# Patient Record
Sex: FEMALE | Race: OTHER | NOT HISPANIC OR LATINO | Employment: STUDENT | URBAN - METROPOLITAN AREA
[De-identification: names, ages, dates, MRNs, and addresses within clinical notes are randomized per-mention and may not be internally consistent; named-entity substitution may affect disease eponyms.]

---
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
## 2022-06-21 ENCOUNTER — OFFICE VISIT (OUTPATIENT)
Dept: FAMILY MEDICINE CLINIC | Facility: CLINIC | Age: 22
End: 2022-06-21

## 2022-06-21 VITALS
TEMPERATURE: 97.5 F | DIASTOLIC BLOOD PRESSURE: 84 MMHG | WEIGHT: 210.6 LBS | HEART RATE: 111 BPM | OXYGEN SATURATION: 98 % | SYSTOLIC BLOOD PRESSURE: 118 MMHG | RESPIRATION RATE: 18 BRPM | BODY MASS INDEX: 36.15 KG/M2

## 2022-06-21 DIAGNOSIS — R19.7 DIARRHEA, UNSPECIFIED TYPE: ICD-10-CM

## 2022-06-21 DIAGNOSIS — R10.84 GENERALIZED ABDOMINAL PAIN: Primary | ICD-10-CM

## 2022-06-21 DIAGNOSIS — R11.2 NAUSEA AND VOMITING, UNSPECIFIED VOMITING TYPE: ICD-10-CM

## 2022-06-21 PROCEDURE — 99213 OFFICE O/P EST LOW 20 MIN: CPT | Performed by: FAMILY MEDICINE

## 2022-06-21 RX ORDER — ONDANSETRON 4 MG/1
4 TABLET, FILM COATED ORAL EVERY 6 HOURS PRN
Qty: 20 TABLET | Refills: 0 | Status: SHIPPED | OUTPATIENT
Start: 2022-06-21 | End: 2022-06-26

## 2022-06-21 RX ORDER — DIPHENOXYLATE HYDROCHLORIDE AND ATROPINE SULFATE 2.5; .025 MG/1; MG/1
1 TABLET ORAL 3 TIMES DAILY PRN
Qty: 9 TABLET | Refills: 0 | Status: SHIPPED | OUTPATIENT
Start: 2022-06-21 | End: 2022-06-24

## 2022-06-21 NOTE — PROGRESS NOTES
Subjective   Al Phan is a 22 y.o. female.   Chief Complaint   Patient presents with   • Abdominal Pain       No sick contacts, no fever  1 day of nausea, vomiting, diarrhea    Abdominal Pain  This is a new problem. The current episode started yesterday. The problem occurs constantly. The pain is located in the generalized abdominal region. The quality of the pain is described as cramping and aching. The pain does not radiate. Associated symptoms include diarrhea, nausea and vomiting. Pertinent negatives include no constipation, dysuria, fever, frequency, hematuria or rash. Nothing relieves the symptoms. Past treatments include acetaminophen. The treatment provided no improvement relief.      The following portions of the patient's history were reviewed and updated as appropriate: allergies, current medications, past family history, past medical history, past social history, past surgical history and problem list.    Patient Active Problem List   Diagnosis   • Moderate persistent asthma without complication   • Other seasonal allergic rhinitis       Current Outpatient Medications on File Prior to Visit   Medication Sig Dispense Refill   • albuterol sulfate  (90 Base) MCG/ACT inhaler Inhale 2 puffs 4 (Four) Times a Day As Needed.     • citalopram (CeleXA) 20 MG tablet Take 1 tablet by mouth Daily.     • clobetasol (TEMOVATE) 0.05 % ointment APPLY THIN COAT TO AFFECTED AREA TWICE A DAY     • fluticasone-salmeterol (ADVAIR) 100-50 MCG/DOSE DISKUS Inhale 1 puff Daily.     • montelukast (SINGULAIR) 10 MG tablet Take 1 tablet by mouth Daily.     • norethindrone-ethinyl estradiol (Loestrin 1/20, 21,) 1-20 MG-MCG per tablet Take 1 tablet by mouth Daily. 28 tablet 12     No current facility-administered medications on file prior to visit.     Current outpatient and discharge medications have been reconciled for the patient.  Reviewed by: Duane Ortega MD      No Known Allergies    Review of Systems    Constitutional: Negative for activity change, appetite change, fatigue and fever.   HENT: Negative for ear pain, swollen glands and voice change.    Eyes: Negative for visual disturbance.   Respiratory: Negative for shortness of breath and wheezing.    Cardiovascular: Negative for chest pain and leg swelling.   Gastrointestinal: Positive for abdominal pain, diarrhea, nausea and vomiting. Negative for blood in stool and constipation.   Endocrine: Negative for polydipsia and polyuria.   Genitourinary: Negative for dysuria, frequency and hematuria.   Musculoskeletal: Negative for joint swelling, neck pain and neck stiffness.   Skin: Negative for rash and wound.   Neurological: Negative for weakness, numbness and headache.   Psychiatric/Behavioral: Negative for suicidal ideas and depressed mood.     I have reviewed and confirmed the accuracy of the ROS as documented by the MA/LPN/RN Duane Ortega MD    Objective   Visit Vitals  /84 (BP Location: Right arm, Patient Position: Sitting, Cuff Size: Adult)   Pulse 111   Temp 97.5 °F (36.4 °C)   Resp 18   Wt 95.5 kg (210 lb 9.6 oz)   LMP 05/25/2022   SpO2 98%   BMI 36.15 kg/m²       Physical Exam  Constitutional:       Appearance: She is well-developed.   HENT:      Head: Normocephalic and atraumatic.      Right Ear: External ear normal.      Left Ear: External ear normal.      Nose: Nose normal.   Eyes:      Pupils: Pupils are equal, round, and reactive to light.   Cardiovascular:      Rate and Rhythm: Normal rate and regular rhythm.      Heart sounds: Normal heart sounds.   Pulmonary:      Effort: Pulmonary effort is normal.      Breath sounds: Normal breath sounds.   Abdominal:      General: Bowel sounds are increased.      Palpations: Abdomen is soft.      Tenderness: There is no guarding or rebound. Negative signs include Thomas's sign and McBurney's sign.   Musculoskeletal:         General: Normal range of motion.      Cervical back: Normal range of  motion and neck supple.   Skin:     General: Skin is warm and dry.   Neurological:      Mental Status: She is alert and oriented to person, place, and time.   Psychiatric:         Behavior: Behavior normal.         Thought Content: Thought content normal.         Judgment: Judgment normal.       Derm Physical Exam    Diagnoses and all orders for this visit:    1. Generalized abdominal pain (Primary)    2. Diarrhea, unspecified type  -     diphenoxylate-atropine (LOMOTIL) 2.5-0.025 MG per tablet; Take 1 tablet by mouth 3 (Three) Times a Day As Needed for Diarrhea for up to 3 days.  Dispense: 9 tablet; Refill: 0    3. Nausea and vomiting, unspecified vomiting type  -     ondansetron (Zofran) 4 MG tablet; Take 1 tablet by mouth Every 6 (Six) Hours As Needed for Nausea or Vomiting for up to 5 days.  Dispense: 20 tablet; Refill: 0     Findings discussed. All questions answered.  Follow-up in approximately 3 days for reevaluation if not improved.  Follow-up sooner for worsening symptoms or any concerns.  Differential diagnosis discussed.   May need labs/imaging if sx persist or worsen     I wore protective equipment throughout this patient encounter to include mask and eye protection. Hand hygiene was performed before donning protective equipment and after removal when leaving the room

## 2022-09-19 ENCOUNTER — TRANSCRIBE ORDERS (OUTPATIENT)
Dept: PHYSICAL THERAPY | Facility: CLINIC | Age: 22
End: 2022-09-19

## 2022-09-19 DIAGNOSIS — M54.42 CHRONIC LEFT-SIDED LOW BACK PAIN WITH LEFT-SIDED SCIATICA: Primary | ICD-10-CM

## 2022-09-19 DIAGNOSIS — G89.29 CHRONIC LEFT-SIDED LOW BACK PAIN WITH LEFT-SIDED SCIATICA: Primary | ICD-10-CM

## 2022-09-23 ENCOUNTER — TREATMENT (OUTPATIENT)
Dept: PHYSICAL THERAPY | Facility: CLINIC | Age: 22
End: 2022-09-23

## 2022-09-23 DIAGNOSIS — G89.29 CHRONIC BILATERAL LOW BACK PAIN WITH BILATERAL SCIATICA: Primary | ICD-10-CM

## 2022-09-23 DIAGNOSIS — M54.42 CHRONIC BILATERAL LOW BACK PAIN WITH BILATERAL SCIATICA: Primary | ICD-10-CM

## 2022-09-23 DIAGNOSIS — M54.41 CHRONIC BILATERAL LOW BACK PAIN WITH BILATERAL SCIATICA: Primary | ICD-10-CM

## 2022-09-23 PROCEDURE — 97110 THERAPEUTIC EXERCISES: CPT | Performed by: PHYSICAL THERAPIST

## 2022-09-23 PROCEDURE — 97012 MECHANICAL TRACTION THERAPY: CPT | Performed by: PHYSICAL THERAPIST

## 2022-09-23 PROCEDURE — 97140 MANUAL THERAPY 1/> REGIONS: CPT | Performed by: PHYSICAL THERAPIST

## 2022-09-23 PROCEDURE — 97161 PT EVAL LOW COMPLEX 20 MIN: CPT | Performed by: PHYSICAL THERAPIST

## 2022-09-23 NOTE — PROGRESS NOTES
Physical Therapy Initial Evaluation and Plan of Care    Patient: Al Phan   : 2000  Diagnosis/ICD-10 Code:  Chronic bilateral low back pain with bilateral sciatica [M54.42, M54.41, G89.29]  Referring practitioner: MILES Palmer  Date of Initial Visit: 2022  Today's Date: 2022  Patient seen for 1 sessions             Subjective Evaluation    History of Present Illness  Mechanism of injury: Patient is a 22 y.o. WF who presents with chronic LBP and radicular pain to knees.  Pain is always worst on L.  Pain rating 7/10 and increases with walking, standing, lifting, exercise.  Pain decreases with sitting or rest.  Patient works at [employer] year round x 4 years, lots of standing, walking and lifting.  Personal goals, decrease pain and increase physical activity.      Patient Goals  Patient goals for therapy: decreased pain, return to sport/leisure activities and increased strength             Objective Oswestry score indicates 34% impairment with limitations in pain, walking, standing, exercise, lifting.  Lumbar AROM is WNL with pain on L lumbar with extension and SBL and rotation L.  No significant tenderness to palpation.  Min tight hams L.  MMT:  4-5/5 B LEs without pain.  Repeated sit to stand = 13 reps in 30 seconds without arm assist.  Negative SLR.  No increased pain with PA glide and/or rotation of L1-5.  Deconditioned abdominals with protruding abdomen.  Only able to maintain neutral spine with 15 deg B leg lower in supine.  Only able to hold crunch with scap off table for 20 sec.      Assessment & Plan     Assessment  Impairments: abnormal muscle tone, abnormal or restricted ROM, activity intolerance, impaired balance, impaired physical strength, lacks appropriate home exercise program and pain with function  Functional Limitations: carrying objects, lifting, walking, uncomfortable because of pain and standing  Goals  Plan Goals: Patient independent with HEP in 2  weeks  Tolerate 10 min Nustep in 2 weeks  Able to walk 45 min without pain while walking in 2 weeks  Able to decrease pain to 5/10 in 2 weeks  Improve function as evidenced by Oswestry score of 20% or less in 12 weeks (34% impairment initially)  Able to return to working out in 12 weeks  Perform 10 reps of repeated sit to stand without arms in 30 seconds in 12 weeks      Plan  Therapy options: will be seen for skilled therapy services  Planned modality interventions: traction  Planned therapy interventions: strengthening, stretching, therapeutic activities, neuromuscular re-education, postural training, manual therapy, balance/weight-bearing training, flexibility, functional ROM exercises and home exercise program  Frequency: 2x week  Duration in weeks: 12  Treatment plan discussed with: patient        Timed:         Manual Therapy:    15     mins  26522;     Therapeutic Exercise:    15     mins  76362;     Neuromuscular Glenys:        mins  93624;    Therapeutic Activity:          mins  71638;     Gait Training:           mins  41917;     Ultrasound:          mins  35025;    Self-care  ____ mins 64426    Un-Timed:  Electrical Stimulation:         mins  27491 (MC );  Dry Needling          mins self-pay 20561  Traction     15     mins 58019  Low Eval     15     Mins  82029  Mod Eval          Mins  39874  Canal repositioning _____ mins  93113        Timed Treatment:   30   mins   Total Treatment:     60   mins    PT SIGNATURE: Robin A Sprigler, PT   [place] license # [id number]  Electronically signed by Robin A Sprigler, PT, 09/23/22, 12:17 PM EDT    Initial Certification  Certification Period: 9/23/2022 through 12/21/2022  I certify that the therapy services are furnished while this patient is under my care.  The services outlined above are required by this patient, and will be reviewed every 90 days.     PHYSICIAN: Ning Ward,  APRN ______________________________________________________________________________________________     DATE: _________________________________________________________________________________________________________________________________    Please sign and return via fax to [phone]. Thank you, [facility].

## 2022-10-13 ENCOUNTER — TREATMENT (OUTPATIENT)
Dept: PHYSICAL THERAPY | Facility: CLINIC | Age: 22
End: 2022-10-13

## 2022-10-13 DIAGNOSIS — M54.41 CHRONIC BILATERAL LOW BACK PAIN WITH BILATERAL SCIATICA: Primary | ICD-10-CM

## 2022-10-13 DIAGNOSIS — G89.29 CHRONIC BILATERAL LOW BACK PAIN WITH BILATERAL SCIATICA: Primary | ICD-10-CM

## 2022-10-13 DIAGNOSIS — M54.42 CHRONIC BILATERAL LOW BACK PAIN WITH BILATERAL SCIATICA: Primary | ICD-10-CM

## 2022-10-13 PROCEDURE — 97110 THERAPEUTIC EXERCISES: CPT | Performed by: PHYSICAL THERAPIST

## 2022-10-13 PROCEDURE — 97112 NEUROMUSCULAR REEDUCATION: CPT | Performed by: PHYSICAL THERAPIST

## 2022-10-13 NOTE — PROGRESS NOTES
Physical Therapy Daily Progress Note    Patient: Al Phan   : 2000  Diagnosis/ICD-10 Code:  Chronic bilateral low back pain with bilateral sciatica [M54.42, M54.41, G89.29]  Referring practitioner: MILES Palmer  Date of Initial Visit: Type: THERAPY  Noted: 2022  Today's Date: 10/13/2022  Patient seen for 2 sessions             Subjective Patient reports she doesn't wish to do traction again but the exercises have decreased back pain.  Her knees are hurting lately.  No c/o back pain today.  She has been able to work out the past couple of weeks.    Objective   See Exercise, Manual, and Modality Logs for complete treatment. Will hold sit to stand and see if that decreases knee pain.  Added pelvic stabilization with bird dog, angry cat, upper row and lat pull.  Added leg press.  Plan to add side plank and bridge NEXT.      Assessment/Plan  Patient independent with HEP in 2 weeks - MET  Tolerate 10 min Nustep in 2 weeks - NOT MET  Able to walk 45 min without pain while walking in 2 weeks  NOT MET  Able to decrease pain to 5/10 in 2 weeks  MET  Improve function as evidenced by Oswestry score of 20% or less in 12 weeks (34% impairment initially)  NOT MET  Able to return to working out in 12 weeks  MET  Perform 10 reps of repeated sit to stand without arms in 30 seconds in 12 weeks   MET    Progress per Plan of Care expiring 22           Timed:         Manual Therapy:        mins  95082;     Therapeutic Exercise:    15     mins  58069;     Neuromuscular Glenys:    15    mins  18353;    Therapeutic Activity:          mins  99735;     Gait Training:           mins  75955;     Ultrasound:          mins  81045;    Ionto                                   mins   47945  Self Care                            mins   38877      Un-Timed:  Electrical Stimulation:         mins  64217 ( );  Dry Needling          mins self-pay  Traction          mins 11683  Low Eval          Mins  00697  Mod  Eval          Mins  41534  High Eval                            Mins  00282  Canalith Repos                   mins  49390    Timed Treatment:   30   mins   Total Treatment:     30   mins    Robin A Sprigler, PT  Physical Therapist

## 2022-10-27 ENCOUNTER — TREATMENT (OUTPATIENT)
Dept: PHYSICAL THERAPY | Facility: CLINIC | Age: 22
End: 2022-10-27

## 2022-10-27 DIAGNOSIS — M54.41 CHRONIC BILATERAL LOW BACK PAIN WITH BILATERAL SCIATICA: Primary | ICD-10-CM

## 2022-10-27 DIAGNOSIS — G89.29 CHRONIC BILATERAL LOW BACK PAIN WITH BILATERAL SCIATICA: Primary | ICD-10-CM

## 2022-10-27 DIAGNOSIS — M54.42 CHRONIC BILATERAL LOW BACK PAIN WITH BILATERAL SCIATICA: Primary | ICD-10-CM

## 2022-10-27 PROCEDURE — 97112 NEUROMUSCULAR REEDUCATION: CPT | Performed by: PHYSICAL THERAPIST

## 2022-10-27 PROCEDURE — 97110 THERAPEUTIC EXERCISES: CPT | Performed by: PHYSICAL THERAPIST

## 2022-10-27 NOTE — PROGRESS NOTES
Physical Therapy Daily Treatment Note    Patient: Al Phan   : 2000  Diagnosis/ICD-10 Code:  Chronic bilateral low back pain with bilateral sciatica [M54.42, M54.41, G89.29]  Referring practitioner: MILES Palmer  Date of Initial Visit: Type: THERAPY  Noted: 2022  Today's Date: 10/27/2022  Patient seen for 3 sessions             Subjective Patient reports she went to a wedding last weekend and had increased pain in back and knees.  She almost called in to work and has never done that.  She reports difficulty finding a comfortable position to sleep.    Objective   See Exercise, Manual, and Modality Logs for complete treatment. Discussed hooklying position for sleeping and tried in clinic with MH on back and knees.  Reviewed and updated HEP.  Added Nustep, side plank and bridging.      Assessment/Plan  Patient independent with HEP in 2 weeks - MET  Tolerate 10 min Nustep in 2 weeks - NOT MET  Able to walk 45 min without pain while walking in 2 weeks  NOT MET  Able to decrease pain to 5/10 in 2 weeks  MET  Improve function as evidenced by Oswestry score of 20% or less in 12 weeks (34% impairment initially)  NOT MET  Able to return to working out in 12 weeks  MET  Perform 10 reps of repeated sit to stand without arms in 30 seconds in 12 weeks   MET    Progress per Plan of Care expiring 22           Timed:         Manual Therapy:         mins  69080;     Therapeutic Exercise:    15     mins  56975;     Neuromuscular Glenys:    15    mins  65154;    Therapeutic Activity:          mins  26522;     Gait Training:           mins  05285;     Ultrasound:          mins  71990;    Ionto                                   mins   94450  Self Care                            mins   11070      Un-Timed:  Electrical Stimulation:         mins  51410 ( );  Dry Needling          mins self-pay  Traction          mins 53423  Low Eval          Mins  29535  Mod Eval          Mins  63016  High Eval                             Mins  43937  [facility]                   mins  40958    Timed Treatment:   30   mins   Total Treatment:     40   mins    Robin A Sprigler, PT  Physical Therapist

## 2022-11-03 ENCOUNTER — TREATMENT (OUTPATIENT)
Dept: PHYSICAL THERAPY | Facility: CLINIC | Age: 22
End: 2022-11-03

## 2022-11-03 DIAGNOSIS — M54.41 CHRONIC BILATERAL LOW BACK PAIN WITH BILATERAL SCIATICA: Primary | ICD-10-CM

## 2022-11-03 DIAGNOSIS — M54.42 CHRONIC BILATERAL LOW BACK PAIN WITH BILATERAL SCIATICA: Primary | ICD-10-CM

## 2022-11-03 DIAGNOSIS — G89.29 CHRONIC BILATERAL LOW BACK PAIN WITH BILATERAL SCIATICA: Primary | ICD-10-CM

## 2022-11-03 PROCEDURE — 97112 NEUROMUSCULAR REEDUCATION: CPT | Performed by: PHYSICAL THERAPIST

## 2022-11-03 PROCEDURE — 97110 THERAPEUTIC EXERCISES: CPT | Performed by: PHYSICAL THERAPIST

## 2022-11-03 NOTE — PROGRESS NOTES
Physical Therapy Daily Treatment Note      Patient: Al Phan   : 2000  Diagnosis/ICD-10 Code:  Chronic bilateral low back pain with bilateral sciatica [M54.42, M54.41, G89.29]   Problems Addressed this Visit    None  Visit Diagnoses     Chronic bilateral low back pain with bilateral sciatica    -  Primary      Diagnoses       Codes Comments    Chronic bilateral low back pain with bilateral sciatica    -  Primary ICD-10-CM: M54.42, M54.41, G89.29  ICD-9-CM: 724.2, 724.3, 338.29          Referring practitioner: MILES Palmer  Date of Initial Visit: Type: THERAPY  Noted: 2022  Today's Date: 11/3/2022    VISIT#: 4    Subjective Patient reports feeling pretty good the past couple days, with only mild aching in low back, which is improved since beginning PT.       Objective     See Exercise, Manual, and Modality Logs for complete treatment.     Assessment/Plan Patient with noticeable shaking with plank and required mild verbal cues for correct technique. Patient will continue to benefit from core strengthening for improved stability with with daily functional activity.     Patient independent with HEP in 2 weeks - MET  Tolerate 10 min Nustep in 2 weeks - NOT MET  Able to walk 45 min without pain while walking in 2 weeks  NOT MET  Able to decrease pain to 5/10 in 2 weeks  MET  Improve function as evidenced by Oswestry score of 20% or less in 12 weeks (34% impairment initially)  NOT MET  Able to return to working out in 12 weeks  MET  Perform 10 reps of repeated sit to stand without arms in 30 seconds in 12 weeks   MET  Progress per Plan of Care and Progress strengthening /stabilization /functional activity         Timed:         Manual Therapy:         mins  78319;     Therapeutic Exercise:    20     mins  41460;     Neuromuscular Glenys:    15    mins  10879;    Therapeutic Activity:          mins  95025;     Gait Training:           mins  83031;     Ultrasound:          mins  89878;    Ionto                                    mins   24259  Self Care                    _____  mins   63152  [facility]                   mins  05528    Un-Timed:  Electrical Stimulation:         mins  09492 ( );  Dry Needling          mins self-pay   Traction          mins 38830    Timed Treatment:   35   mins   Total Treatment:     35   mins    Don Jefefrs PTA  [place] [id number]  Physical Therapist Assistant

## 2022-11-10 ENCOUNTER — TREATMENT (OUTPATIENT)
Dept: PHYSICAL THERAPY | Facility: CLINIC | Age: 22
End: 2022-11-10

## 2022-11-10 DIAGNOSIS — M54.42 CHRONIC BILATERAL LOW BACK PAIN WITH BILATERAL SCIATICA: Primary | ICD-10-CM

## 2022-11-10 DIAGNOSIS — G89.29 CHRONIC BILATERAL LOW BACK PAIN WITH BILATERAL SCIATICA: Primary | ICD-10-CM

## 2022-11-10 DIAGNOSIS — M54.41 CHRONIC BILATERAL LOW BACK PAIN WITH BILATERAL SCIATICA: Primary | ICD-10-CM

## 2022-11-10 PROCEDURE — 97530 THERAPEUTIC ACTIVITIES: CPT | Performed by: PHYSICAL THERAPIST

## 2022-11-10 PROCEDURE — 97112 NEUROMUSCULAR REEDUCATION: CPT | Performed by: PHYSICAL THERAPIST

## 2022-11-10 PROCEDURE — 97110 THERAPEUTIC EXERCISES: CPT | Performed by: PHYSICAL THERAPIST

## 2022-11-10 NOTE — PROGRESS NOTES
"     Physical Therapy Daily Treatment Note    Patient: Al Phan   : 2000  Diagnosis/ICD-10 Code:  Chronic bilateral low back pain with bilateral sciatica [M54.42, M54.41, G89.29]  Referring practitioner: MILES Palmer  Date of Initial Visit: Type: THERAPY  Noted: 2022  Today's Date: 11/10/2022  Patient seen for 5 sessions             Subjective Patient reports she's doing well with less pain and increasing core strength and stability.  She has returned to working out as schedule allows.  PT has really helped her get through the week with significantly decreased pain.  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  She has not tried to walk 45 min yet so will try that before next visit.  She is sleeping better.  She reports decreased pain and stiffness with recent trip to [place] 3 hour [address].    Objective   See Exercise, Manual, and Modality Logs for complete treatment. Able to tolerate increased weight on leg press, 10 min on seated elliptical.  Oswestry score reassessed today indicates 12% impairment improved from 34% initially. Lower abdominals have improved strength, able to stabilize spine and lower legs to 45 deg in supine.  Discussed proper lifting techniques and engaging abdominals.  Added plank leg extensions to progress pelvic stabilization.  Able to hold plank 15 sec today.        Assessment/Plan  Patient independent with HEP in 2 week -  MET  Tolerate 10 min Nustep in 2 weeks - MET  Able to walk 45 min without pain while walking in 2 weeks  NOT MET  Able to decrease pain to 5/10 in 2 weeks  MET  Improve function as evidenced by Oswestry score of 20% or less in 12 weeks (34% impairment initially)  NOT MET  Able to return to working out in 12 weeks  MET  Perform 10 reps of repeated sit to stand without arms in 30 seconds in 12 weeks   MET    Progress per Plan of Care and Anticipate DC next Visit expiring 22           Timed: "         Manual Therapy:         mins  01444;     Therapeutic Exercise:    15     mins  72262;     Neuromuscular Glenys:    15    mins  58332;    Therapeutic Activity:   10       mins  71545;     Gait Training:           mins  13322;     Ultrasound:          mins  89303;    Ionto                                   mins   71725  Self Care                            mins   14232      Un-Timed:  Electrical Stimulation:         mins  16773 ( );  Dry Needling          mins self-pay  Traction          mins 09619  Low Eval          Mins  77382  Mod Eval          Mins  04289  High Eval                            Mins  14710  [facility]                   mins  72011    Timed Treatment:   40   mins   Total Treatment:     40   mins    Robin A Sprigler, PT  Physical Therapist

## 2022-11-17 ENCOUNTER — DOCUMENTATION (OUTPATIENT)
Dept: PHYSICAL THERAPY | Facility: CLINIC | Age: 22
End: 2022-11-17

## 2022-11-17 DIAGNOSIS — M54.41 CHRONIC BILATERAL LOW BACK PAIN WITH BILATERAL SCIATICA: Primary | ICD-10-CM

## 2022-11-17 DIAGNOSIS — G89.29 CHRONIC BILATERAL LOW BACK PAIN WITH BILATERAL SCIATICA: Primary | ICD-10-CM

## 2022-11-17 DIAGNOSIS — M54.42 CHRONIC BILATERAL LOW BACK PAIN WITH BILATERAL SCIATICA: Primary | ICD-10-CM

## 2022-11-17 NOTE — PROGRESS NOTES
Goals: Partially Met    Discharge Plan: Continue with current home exercise program as instructed    Comments  Patient failed to return for 11/17 visit.  Discharge done in error.  Patient had rescheduled for 12/1.                        Date of Discharge 11/17/22        Robin A Sprigler, PT  Physical Therapist

## 2022-12-01 ENCOUNTER — TREATMENT (OUTPATIENT)
Dept: PHYSICAL THERAPY | Facility: CLINIC | Age: 22
End: 2022-12-01

## 2022-12-01 DIAGNOSIS — M54.42 CHRONIC BILATERAL LOW BACK PAIN WITH BILATERAL SCIATICA: Primary | ICD-10-CM

## 2022-12-01 DIAGNOSIS — M54.41 CHRONIC BILATERAL LOW BACK PAIN WITH BILATERAL SCIATICA: Primary | ICD-10-CM

## 2022-12-01 DIAGNOSIS — G89.29 CHRONIC BILATERAL LOW BACK PAIN WITH BILATERAL SCIATICA: Primary | ICD-10-CM

## 2022-12-01 PROCEDURE — 97110 THERAPEUTIC EXERCISES: CPT | Performed by: PHYSICAL THERAPIST

## 2022-12-01 PROCEDURE — 97530 THERAPEUTIC ACTIVITIES: CPT | Performed by: PHYSICAL THERAPIST

## 2022-12-01 NOTE — PROGRESS NOTES
"Discharge Summary  Discharge Summary from Physical Therapy Report          Goals: All Met    Discharge Plan: Continue with current home exercise program as instructed    Comments See note below    Date of Discharge 22        Robin A Sprigler, PT  Physical Therapist               Physical Therapy Daily Treatment Note    Patient: Al Phan   : 2000  Diagnosis/ICD-10 Code:  Chronic bilateral low back pain with bilateral sciatica [M54.42, M54.41, G89.29]  Referring practitioner: MILES Palmer  Date of Initial Visit: No linked episodes  Today's Date: 2022  Patient seen for 6 sessions             Subjective Patient reports she's doing well with less pain and increasing core strength and stability.  She has returned to working out as schedule allows.  PT has really helped her get through the week with significantly decreased pain.  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  She was able to walk 60  min without any pain.  She is sleeping better.  She reports decreased pain and stiffness with recent trip to [place] 3 hour drive.    Objective   See Exercise, Manual, and Modality Logs for complete treatment. Able to tolerate increased weight on leg press, 10 min on seated elliptical.  Progressed UE strengthening today with good response.  Oswestry score reassessed today indicates 12% impairment improved from 34% initially. Lower abdominals have improved strength, able to stabilize spine and lower legs to 45 deg in supine.  Discussed proper lifting techniques and engaging abdominals.  Added plank leg extensions to progress pelvic stabilization.  Able to hold plank 15 sec today.  All goals met.      Assessment/Plan  Patient independent with HEP in 2 week -  MET  Tolerate 10 min Nustep in 2 weeks - MET  Able to walk 45 min without pain while walking in 2 weeks   MET  Able to decrease pain to 5/10 in 2 weeks  MET  Improve function as evidenced by " Oswestry score of 20% or less in 12 weeks (34% impairment initially)  MET indicating 12% impairment  Able to return to working out in 12 weeks  MET  Perform 10 reps of repeated sit to stand without arms in 30 seconds in 12 weeks   MET    Discharge to [facility] and self-management           Timed:         Manual Therapy:         mins  51674;     Therapeutic Exercise:    15     mins  91779;     Neuromuscular Glenys:        mins  56465;    Therapeutic Activity:     25     mins  03305;     Gait Training:           mins  50485;     Ultrasound:          mins  76572;    Ionto                                   mins   58327  Self Care                            mins   61681      Un-Timed:  Electrical Stimulation:         mins  67169 ( );  Dry Needling          mins self-pay  Traction          mins 95779  Low Eval          Mins  41679  Mod Eval          Mins  40461  High Eval                            Mins  54676  [facility]                   mins  19856    Timed Treatment:   40   mins   Total Treatment:     40   mins    Robin A Sprigler, PT  Physical Therapist